# Patient Record
Sex: FEMALE | Race: WHITE | NOT HISPANIC OR LATINO | URBAN - METROPOLITAN AREA
[De-identification: names, ages, dates, MRNs, and addresses within clinical notes are randomized per-mention and may not be internally consistent; named-entity substitution may affect disease eponyms.]

---

## 2017-01-03 ENCOUNTER — AMBULATORY - HEALTHEAST (OUTPATIENT)
Dept: CARDIAC REHAB | Facility: HOSPITAL | Age: 72
End: 2017-01-03

## 2017-01-03 DIAGNOSIS — Z95.5 STENTED CORONARY ARTERY: ICD-10-CM

## 2017-01-09 ENCOUNTER — AMBULATORY - HEALTHEAST (OUTPATIENT)
Dept: CARDIAC REHAB | Facility: HOSPITAL | Age: 72
End: 2017-01-09

## 2017-01-09 DIAGNOSIS — Z95.5 STENTED CORONARY ARTERY: ICD-10-CM

## 2017-01-10 ENCOUNTER — AMBULATORY - HEALTHEAST (OUTPATIENT)
Dept: CARDIOLOGY | Facility: CLINIC | Age: 72
End: 2017-01-10

## 2017-01-10 ENCOUNTER — OFFICE VISIT - HEALTHEAST (OUTPATIENT)
Dept: CARDIOLOGY | Facility: CLINIC | Age: 72
End: 2017-01-10

## 2017-01-10 DIAGNOSIS — E78.5 HYPERLIPIDEMIA LDL GOAL <70: ICD-10-CM

## 2017-01-10 DIAGNOSIS — I10 ESSENTIAL HYPERTENSION: ICD-10-CM

## 2017-01-10 DIAGNOSIS — I25.10 CORONARY ARTERY DISEASE INVOLVING NATIVE CORONARY ARTERY OF NATIVE HEART WITHOUT ANGINA PECTORIS: ICD-10-CM

## 2017-01-10 ASSESSMENT — MIFFLIN-ST. JEOR: SCORE: 1287.2

## 2017-01-11 ENCOUNTER — AMBULATORY - HEALTHEAST (OUTPATIENT)
Dept: CARDIAC REHAB | Facility: HOSPITAL | Age: 72
End: 2017-01-11

## 2017-01-11 DIAGNOSIS — Z95.5 STENTED CORONARY ARTERY: ICD-10-CM

## 2017-06-07 ENCOUNTER — HOSPITAL ENCOUNTER (OUTPATIENT)
Dept: LAB | Age: 72
Setting detail: SPECIMEN
Discharge: HOME | End: 2017-06-07

## 2017-06-07 ENCOUNTER — RECORDS - HEALTHEAST (OUTPATIENT)
Dept: LAB | Facility: CLINIC | Age: 72
End: 2017-06-07

## 2017-06-07 LAB
CHOLEST SERPL-MCNC: 165 MG/DL
FASTING STATUS PATIENT QL REPORTED: NORMAL
HDLC SERPL-MCNC: 53 MG/DL
LDLC SERPL CALC-MCNC: 85 MG/DL
TRIGL SERPL-MCNC: 133 MG/DL

## 2017-06-21 ENCOUNTER — COMMUNICATION - HEALTHEAST (OUTPATIENT)
Dept: ADMINISTRATIVE | Facility: CLINIC | Age: 72
End: 2017-06-21

## 2017-07-11 ENCOUNTER — HOSPITAL ENCOUNTER (OUTPATIENT)
Dept: MAMMOGRAPHY | Facility: HOSPITAL | Age: 72
Discharge: HOME | End: 2017-07-11
Attending: FAMILY MEDICINE

## 2017-07-11 ENCOUNTER — AMBULATORY - HEALTHEAST (OUTPATIENT)
Dept: CARDIOLOGY | Facility: CLINIC | Age: 72
End: 2017-07-11

## 2017-07-11 DIAGNOSIS — Z12.31 VISIT FOR SCREENING MAMMOGRAM: ICD-10-CM

## 2017-07-12 ENCOUNTER — OFFICE VISIT - HEALTHEAST (OUTPATIENT)
Dept: CARDIOLOGY | Facility: CLINIC | Age: 72
End: 2017-07-12

## 2017-07-12 ENCOUNTER — AMBULATORY - HEALTHEAST (OUTPATIENT)
Dept: CARDIOLOGY | Facility: CLINIC | Age: 72
End: 2017-07-12

## 2017-07-12 DIAGNOSIS — I25.10 CORONARY ARTERY DISEASE INVOLVING NATIVE CORONARY ARTERY OF NATIVE HEART WITHOUT ANGINA PECTORIS: ICD-10-CM

## 2017-07-12 DIAGNOSIS — E78.5 HYPERLIPIDEMIA LDL GOAL <70: ICD-10-CM

## 2017-07-12 DIAGNOSIS — I10 ESSENTIAL HYPERTENSION: ICD-10-CM

## 2017-07-12 RX ORDER — AMOXICILLIN 500 MG/1
2000 CAPSULE ORAL
Status: SHIPPED | COMMUNITY
Start: 2017-04-27

## 2017-07-12 ASSESSMENT — MIFFLIN-ST. JEOR: SCORE: 1305.34

## 2017-11-16 ENCOUNTER — RECORDS - HEALTHEAST (OUTPATIENT)
Dept: ADMINISTRATIVE | Facility: OTHER | Age: 72
End: 2017-11-16

## 2017-11-16 ENCOUNTER — HOSPITAL ENCOUNTER (OUTPATIENT)
Dept: MAMMOGRAPHY | Facility: HOSPITAL | Age: 72
Discharge: HOME | End: 2017-11-16
Attending: OBSTETRICS & GYNECOLOGY

## 2017-11-16 DIAGNOSIS — N63.20 LUMP OF LEFT BREAST: ICD-10-CM

## 2017-11-16 DIAGNOSIS — N64.59 OTHER SIGNS AND SYMPTOMS IN BREAST (CODE): ICD-10-CM

## 2017-11-16 DIAGNOSIS — N64.9 DISORDER OF BREAST: ICD-10-CM

## 2018-04-19 ENCOUNTER — COMMUNICATION - HEALTHEAST (OUTPATIENT)
Dept: TELEHEALTH | Facility: CLINIC | Age: 73
End: 2018-04-19

## 2018-05-08 ENCOUNTER — COMMUNICATION - HEALTHEAST (OUTPATIENT)
Dept: ADMINISTRATIVE | Facility: CLINIC | Age: 73
End: 2018-05-08

## 2018-06-11 ENCOUNTER — COMMUNICATION - HEALTHEAST (OUTPATIENT)
Dept: TELEHEALTH | Facility: CLINIC | Age: 73
End: 2018-06-11

## 2018-06-11 ENCOUNTER — OFFICE VISIT - HEALTHEAST (OUTPATIENT)
Dept: CARDIOLOGY | Facility: CLINIC | Age: 73
End: 2018-06-11

## 2018-06-11 DIAGNOSIS — E78.5 HYPERLIPIDEMIA LDL GOAL <70: ICD-10-CM

## 2018-06-11 DIAGNOSIS — I25.10 CORONARY ARTERY DISEASE INVOLVING NATIVE CORONARY ARTERY OF NATIVE HEART WITHOUT ANGINA PECTORIS: ICD-10-CM

## 2018-06-11 DIAGNOSIS — Z01.810 PRE-OPERATIVE CARDIOVASCULAR EXAMINATION: ICD-10-CM

## 2018-06-11 DIAGNOSIS — I10 ESSENTIAL HYPERTENSION: ICD-10-CM

## 2018-06-11 ASSESSMENT — MIFFLIN-ST. JEOR: SCORE: 1263.39

## 2018-06-20 ENCOUNTER — RECORDS - HEALTHEAST (OUTPATIENT)
Dept: LAB | Facility: CLINIC | Age: 73
End: 2018-06-20

## 2018-06-20 LAB
ALBUMIN SERPL-MCNC: 3.7 G/DL (ref 3.5–5)
ALP SERPL-CCNC: 79 U/L (ref 45–120)
ALT SERPL W P-5'-P-CCNC: 20 U/L (ref 0–45)
ANION GAP SERPL CALCULATED.3IONS-SCNC: 10 MMOL/L (ref 5–18)
AST SERPL W P-5'-P-CCNC: 30 U/L (ref 0–40)
BILIRUB SERPL-MCNC: 0.7 MG/DL (ref 0–1)
BUN SERPL-MCNC: 16 MG/DL (ref 8–28)
CALCIUM SERPL-MCNC: 9.5 MG/DL (ref 8.5–10.5)
CHLORIDE BLD-SCNC: 104 MMOL/L (ref 98–107)
CHOLEST SERPL-MCNC: 165 MG/DL
CO2 SERPL-SCNC: 26 MMOL/L (ref 22–31)
CREAT SERPL-MCNC: 0.78 MG/DL (ref 0.6–1.1)
FASTING STATUS PATIENT QL REPORTED: YES
GFR SERPL CREATININE-BSD FRML MDRD: >60 ML/MIN/1.73M2
GLUCOSE BLD-MCNC: 89 MG/DL (ref 70–125)
HDLC SERPL-MCNC: 49 MG/DL
LDLC SERPL CALC-MCNC: 91 MG/DL
POTASSIUM BLD-SCNC: 4.3 MMOL/L (ref 3.5–5)
PROT SERPL-MCNC: 7.3 G/DL (ref 6–8)
SODIUM SERPL-SCNC: 140 MMOL/L (ref 136–145)
TRIGL SERPL-MCNC: 127 MG/DL
TSH SERPL DL<=0.005 MIU/L-ACNC: 1.16 UIU/ML (ref 0.3–5)

## 2018-07-06 ENCOUNTER — HOSPITAL ENCOUNTER (OUTPATIENT)
Dept: MAMMOGRAPHY | Facility: CLINIC | Age: 73
Discharge: HOME | End: 2018-07-06
Attending: FAMILY MEDICINE

## 2018-07-06 DIAGNOSIS — Z12.31 VISIT FOR SCREENING MAMMOGRAM: ICD-10-CM

## 2018-07-11 ENCOUNTER — RECORDS - HEALTHEAST (OUTPATIENT)
Dept: ADMINISTRATIVE | Facility: OTHER | Age: 73
End: 2018-07-11

## 2018-07-13 ENCOUNTER — HOSPITAL ENCOUNTER (OUTPATIENT)
Dept: MAMMOGRAPHY | Facility: CLINIC | Age: 73
Discharge: HOME | End: 2018-07-13
Attending: FAMILY MEDICINE

## 2018-07-13 DIAGNOSIS — N64.89 BREAST ASYMMETRY: ICD-10-CM

## 2019-04-09 ENCOUNTER — COMMUNICATION - HEALTHEAST (OUTPATIENT)
Dept: ADMINISTRATIVE | Facility: CLINIC | Age: 74
End: 2019-04-09

## 2019-05-08 ENCOUNTER — COMMUNICATION - HEALTHEAST (OUTPATIENT)
Dept: ADMINISTRATIVE | Facility: CLINIC | Age: 74
End: 2019-05-08

## 2019-06-24 ENCOUNTER — RECORDS - HEALTHEAST (OUTPATIENT)
Dept: ADMINISTRATIVE | Facility: OTHER | Age: 74
End: 2019-06-24

## 2019-06-24 ENCOUNTER — RECORDS - HEALTHEAST (OUTPATIENT)
Dept: LAB | Facility: CLINIC | Age: 74
End: 2019-06-24

## 2019-06-24 LAB
ALBUMIN SERPL-MCNC: 4 G/DL (ref 3.5–5)
ALP SERPL-CCNC: 71 U/L (ref 45–120)
ALT SERPL W P-5'-P-CCNC: 19 U/L (ref 0–45)
ANION GAP SERPL CALCULATED.3IONS-SCNC: 10 MMOL/L (ref 5–18)
AST SERPL W P-5'-P-CCNC: 29 U/L (ref 0–40)
BASOPHILS # BLD AUTO: 0.1 THOU/UL (ref 0–0.2)
BASOPHILS NFR BLD AUTO: 1 % (ref 0–2)
BILIRUB SERPL-MCNC: 0.7 MG/DL (ref 0–1)
BUN SERPL-MCNC: 21 MG/DL (ref 8–28)
CALCIUM SERPL-MCNC: 10 MG/DL (ref 8.5–10.5)
CHLORIDE BLD-SCNC: 103 MMOL/L (ref 98–107)
CHOLEST SERPL-MCNC: 181 MG/DL
CO2 SERPL-SCNC: 26 MMOL/L (ref 22–31)
CREAT SERPL-MCNC: 0.83 MG/DL (ref 0.6–1.1)
EOSINOPHIL # BLD AUTO: 0.2 THOU/UL (ref 0–0.4)
EOSINOPHIL NFR BLD AUTO: 4 % (ref 0–6)
ERYTHROCYTE [DISTWIDTH] IN BLOOD BY AUTOMATED COUNT: 13.1 % (ref 11–14.5)
FASTING STATUS PATIENT QL REPORTED: NORMAL
GFR SERPL CREATININE-BSD FRML MDRD: >60 ML/MIN/1.73M2
GLUCOSE BLD-MCNC: 94 MG/DL (ref 70–125)
HCT VFR BLD AUTO: 39 % (ref 35–47)
HDLC SERPL-MCNC: 55 MG/DL
HGB BLD-MCNC: 12.6 G/DL (ref 12–16)
LDLC SERPL CALC-MCNC: 102 MG/DL
LYMPHOCYTES # BLD AUTO: 1.5 THOU/UL (ref 0.8–4.4)
LYMPHOCYTES NFR BLD AUTO: 23 % (ref 20–40)
MCH RBC QN AUTO: 29.6 PG (ref 27–34)
MCHC RBC AUTO-ENTMCNC: 32.3 G/DL (ref 32–36)
MCV RBC AUTO: 92 FL (ref 80–100)
MONOCYTES # BLD AUTO: 0.5 THOU/UL (ref 0–0.9)
MONOCYTES NFR BLD AUTO: 8 % (ref 2–10)
NEUTROPHILS # BLD AUTO: 4.2 THOU/UL (ref 2–7.7)
NEUTROPHILS NFR BLD AUTO: 65 % (ref 50–70)
PLATELET # BLD AUTO: 266 THOU/UL (ref 140–440)
PMV BLD AUTO: 11.2 FL (ref 8.5–12.5)
POTASSIUM BLD-SCNC: 4 MMOL/L (ref 3.5–5)
PROT SERPL-MCNC: 7.3 G/DL (ref 6–8)
RBC # BLD AUTO: 4.25 MILL/UL (ref 3.8–5.4)
SODIUM SERPL-SCNC: 139 MMOL/L (ref 136–145)
TRIGL SERPL-MCNC: 119 MG/DL
WBC: 6.4 THOU/UL (ref 4–11)

## 2019-07-16 ASSESSMENT — MIFFLIN-ST. JEOR: SCORE: 1245.24

## 2019-07-17 ENCOUNTER — ANESTHESIA - HEALTHEAST (OUTPATIENT)
Dept: SURGERY | Facility: HOSPITAL | Age: 74
End: 2019-07-17

## 2019-07-18 ENCOUNTER — SURGERY - HEALTHEAST (OUTPATIENT)
Dept: SURGERY | Facility: HOSPITAL | Age: 74
End: 2019-07-18

## 2019-07-18 ASSESSMENT — MIFFLIN-ST. JEOR: SCORE: 1272.46

## 2019-07-23 ENCOUNTER — AMBULATORY - HEALTHEAST (OUTPATIENT)
Dept: CARDIOLOGY | Facility: CLINIC | Age: 74
End: 2019-07-23

## 2019-07-29 ENCOUNTER — OFFICE VISIT - HEALTHEAST (OUTPATIENT)
Dept: CARDIOLOGY | Facility: CLINIC | Age: 74
End: 2019-07-29

## 2019-07-29 DIAGNOSIS — I25.10 CORONARY ARTERY DISEASE INVOLVING NATIVE CORONARY ARTERY OF NATIVE HEART WITHOUT ANGINA PECTORIS: ICD-10-CM

## 2019-07-29 DIAGNOSIS — E78.5 DYSLIPIDEMIA, GOAL LDL BELOW 70: ICD-10-CM

## 2019-07-29 DIAGNOSIS — I10 ESSENTIAL HYPERTENSION: ICD-10-CM

## 2019-07-29 RX ORDER — ROSUVASTATIN CALCIUM 40 MG/1
40 TABLET, COATED ORAL AT BEDTIME
Qty: 30 TABLET | Refills: 11 | Status: SHIPPED | OUTPATIENT
Start: 2019-07-29

## 2019-07-29 ASSESSMENT — MIFFLIN-ST. JEOR: SCORE: 1277

## 2019-08-30 ENCOUNTER — HOSPITAL ENCOUNTER (OUTPATIENT)
Dept: MAMMOGRAPHY | Facility: CLINIC | Age: 74
Discharge: HOME | End: 2019-08-30
Attending: FAMILY MEDICINE

## 2019-08-30 DIAGNOSIS — Z12.31 VISIT FOR SCREENING MAMMOGRAM: ICD-10-CM

## 2021-05-24 ENCOUNTER — RECORDS - HEALTHEAST (OUTPATIENT)
Dept: ADMINISTRATIVE | Facility: CLINIC | Age: 76
End: 2021-05-24

## 2021-05-25 ENCOUNTER — RECORDS - HEALTHEAST (OUTPATIENT)
Dept: ADMINISTRATIVE | Facility: CLINIC | Age: 76
End: 2021-05-25

## 2021-05-26 ENCOUNTER — RECORDS - HEALTHEAST (OUTPATIENT)
Dept: ADMINISTRATIVE | Facility: CLINIC | Age: 76
End: 2021-05-26

## 2021-05-27 ENCOUNTER — RECORDS - HEALTHEAST (OUTPATIENT)
Dept: ADMINISTRATIVE | Facility: CLINIC | Age: 76
End: 2021-05-27

## 2021-05-28 ENCOUNTER — RECORDS - HEALTHEAST (OUTPATIENT)
Dept: ADMINISTRATIVE | Facility: CLINIC | Age: 76
End: 2021-05-28

## 2021-05-30 ENCOUNTER — RECORDS - HEALTHEAST (OUTPATIENT)
Dept: ADMINISTRATIVE | Facility: CLINIC | Age: 76
End: 2021-05-30

## 2021-05-30 VITALS — BODY MASS INDEX: 31.58 KG/M2 | WEIGHT: 184 LBS

## 2021-05-30 VITALS — BODY MASS INDEX: 32.07 KG/M2 | WEIGHT: 181 LBS | HEIGHT: 63 IN

## 2021-05-30 VITALS — BODY MASS INDEX: 32.94 KG/M2 | WEIGHT: 183 LBS

## 2021-05-30 NOTE — ANESTHESIA PREPROCEDURE EVALUATION
Anesthesia Evaluation      Patient summary reviewed   History of anesthetic complications     Airway   Mallampati: I  Neck ROM: full   Pulmonary - normal exam                          Cardiovascular - normal exam  (+) hypertension, CAD, CABG/stent, , hypercholesterolemia,     (-) murmur  ECG reviewed  Rhythm: regular  Rate: normal,    no murmur      Neuro/Psych    (+) anxiety/panic attacks,     Endo/Other       GI/Hepatic/Renal    (+) GERD,             Dental - normal exam                        Anesthesia Plan  Planned anesthetic: general endotracheal    ASA 2   Induction: intravenous   Anesthetic plan and risks discussed with: patient and spouse  Anesthesia plan special considerations: antiemetics,   Post-op plan: routine recovery

## 2021-05-30 NOTE — ANESTHESIA CARE TRANSFER NOTE
Last vitals:   Vitals:    07/18/19 1136   BP: 148/69   Pulse: 93   Resp: 20   Temp: 37.1  C (98.8  F)   SpO2: 99%     Patient's level of consciousness is drowsy  Spontaneous respirations: yes  Maintains airway independently: yes  Dentition unchanged: yes  Oropharynx: oropharynx clear of all foreign objects    QCDR Measures:  ASA# 20 - Surgical Safety Checklist: WHO surgical safety checklist completed prior to induction    PQRS# 430 - Adult PONV Prevention: 4558F - Pt received => 2 anti-emetic agents (different classes) preop & intraop  ASA# 8 - Peds PONV Prevention: 4558F - Pt received => 2 anti-emetic agents (different classes) preop & intraop  PQRS# 424 - Mercedes-op Temp Management: 4559F - At least one body temp DOCUMENTED => 35.5C or 95.9F within required timeframe  PQRS# 426 - PACU Transfer Protocol: - Transfer of care checklist used  ASA# 14 - Acute Post-op Pain: ASA14B - Patient did NOT experience pain >= 7 out of 10

## 2021-05-30 NOTE — ANESTHESIA POSTPROCEDURE EVALUATION
Patient: Dora Collins  CYSTOCELE REPAIR, UTEROSACRAL LIGAMENT SUSPENSION, RECTOCELE REPAIR, CYSTOSCOPY  Anesthesia type: general    Patient location: N124  Last vitals:   Vitals Value Taken Time   /55 7/19/2019  7:45 AM   Temp 36.8  C (98.2  F) 7/19/2019  7:45 AM   Pulse 66 7/19/2019  7:45 AM   Resp 17 7/19/2019  7:45 AM   SpO2 97 % 7/19/2019  7:45 AM     Post vital signs: stable  Level of consciousness: alert and conversing  Post-anesthesia pain: pain controlled  Post-anesthesia nausea and vomiting: no  Pulmonary: room air  Cardiovascular: stable and blood pressure at baseline  Hydration: adequate  Anesthetic events: no    QCDR Measures:  ASA# 11 - Mercedes-op Cardiac Arrest: ASA11B - Patient did NOT experience unanticipated cardiac arrest  ASA# 12 - Mercedes-op Mortality Rate: ASA12B - Patient did NOT die  ASA# 13 - PACU Re-Intubation Rate: ASA13B - Patient did NOT require a new airway mgmt  ASA# 10 - Composite Anes Safety: ASA10A - No serious adverse event    Additional Notes:

## 2021-05-31 VITALS — BODY MASS INDEX: 32.78 KG/M2 | HEIGHT: 63 IN | WEIGHT: 185 LBS

## 2021-06-01 ENCOUNTER — RECORDS - HEALTHEAST (OUTPATIENT)
Dept: ADMINISTRATIVE | Facility: CLINIC | Age: 76
End: 2021-06-01

## 2021-06-01 VITALS — WEIGHT: 174 LBS | HEIGHT: 63 IN | BODY MASS INDEX: 30.83 KG/M2

## 2021-06-02 ENCOUNTER — RECORDS - HEALTHEAST (OUTPATIENT)
Dept: ADMINISTRATIVE | Facility: CLINIC | Age: 76
End: 2021-06-02

## 2021-06-03 VITALS — HEIGHT: 63 IN | WEIGHT: 177 LBS | BODY MASS INDEX: 31.36 KG/M2

## 2021-06-03 VITALS — HEIGHT: 63 IN | WEIGHT: 176 LBS | BODY MASS INDEX: 31.18 KG/M2

## 2021-06-08 NOTE — PROGRESS NOTES
See Doc Flowsheet  Dora Collins has participated in 6 sessions of Phase II Cardiac Rehab.    Progress Report: Dr. Fuentes  Cardiac Rehab Treatment Progress Report 12/19/2016 12/21/2016 12/28/2016 1/3/2017 1/9/2017   Pre Exercise  HR 66 67 65 72 73   Pre Exercise /66 122/68 102/60 110/60 108/58   Pre Blood Sugar (mg/dl) - - - - -   Treadmill Peak  115 115 119 116-120   Nustep Peak Heart Rate 90 85 85 99 103   Nustep Peak Blood Pressure 144/72 140/80 138/66 130/70 158/60   Heart Rate 69 68 74 77 76   Post Exercise /64 108/60 110/56 122/62 102/60   Post Blood Sugar (mg/dl) - - - - -   ECG SR/ST SR/ST SR/ST SR/ST SR/ST isolated PVC rare   Total Exercise Minutes 35 40 40 40 42         Current Status:  Currently exercising for 40 min at 2.8 met level, without sx. Plans to leave for [place] next week for the winter. Has health club there that she states she will cont. her exercise.     If Physician recommends change in treatment plan, please place orders.        __________________________________________________      _____________  Signature                                                                                                  Date

## 2021-06-08 NOTE — PROGRESS NOTES
ITP ASSESSMENT   Assessment Day: 30 Day - Discharge Note  Session Number: 7  Precautions: Standard  Diagnosis: Stent  Risk Stratification: Medium  Referring Provider: Hedy  EXERCISE  Exercise Assessment: Discharge     Tolerating 2.8 METs for 40'. Denies sx                       Exercise Plan  Education Goals Met: Has system for taking medication.;Medication review.;Patient can state cardiac s/s and appropriate emergency response.                        Goals Met  Initial ADL's goals met: Pt is walking 15-30' daily - goal met  Initial Leisure goals met: Pt is traveling to [place] next week - goal not yet met  Intial Work goals met: Pt has cleaned out boxes - goal met  Initial Progression: Pt will meet all goals next week, states no limitations with activities    Current Exercise (mins/week): 150    Interventions  Home Exercise:  Mode: Walk/Pool Walk  Frequency: Daily  Duration: 45-60'    Education Material : Individual education and counseling;Offer educational classes;Educational videos;Provide written material    Education Completed  Exercise Education Completed: Cardiac Anatomy;Signs and Symptoms;Medication review;RPE;Emergency Plan;Home Exercise;Warm up/cool down;FITT Principles;BP/HR Reponse to exercise;Benefits of Exercise;End point of exercise            Exercise Follow-up/Discharge  Follow up/Discharge: Reviewed with pt FITT principle, RPE scale, s/s of intolerance of exercise, and emergency plan. Pt states understanding NUTRITION  Nutrition Assessment: Discharge    Nutrition Risk Factors:  Nutrition Risk Factors: Dyslipidemia;Overweight  Cholesterol: 191  LDL: 103  HDL: 51  Triglycerides: 185    Nutrition Plan  Interventions  Nutrition Interventions: Therapist/Patient discussion (Patient declined nutrition consult.)    Education Completed  Nutrition Education Completed: Low Saturated fat diet;Risk factor overview;Low sodium diet    Goals Met  Nutrition Goals Met: Patient can identify their risk factors for  "CAD;Patient follows a low sodium diet;Completed Nutritional Risk Screen;Provided Rate your Plate Survey;Reviewed Dietitian schedule;Patient states following a low saturated fat diet;Patient knows appropriate portion size    Height, Weight, and  BMI  Weight: 183 lb (83 kg)  Height: 5' 3\" (1.6 m)  BMI: 32.42    Nutrition Follow-up  Follow-up/Discharge: Patient stated she follows a low saturated fat and low sodium diet with lots of fruits and vegetables.  She stated she does not need any further dietary education.      Other Risk Factors  Other Risk Factor Assessment: Discharge    HTN Risk Factor: Hypertension    Pre Exercise BP: 104/70  Post Exercise BP: 102/60    Hypertension Plan    Goals Met  HTN Goals Met: Take medication as prescribed;Follow low sodium diet;Exercises regularly    HTN Interventions  HTN Interventions: Therapist/patient discussion;Offer educational videos;Offer educational classes;Provide written material    HTN Education Completed  HTN Education Completed: Low sodium diet;Medication review;Risk factor overview    Tobacco Risk Factor: NA    Risk Factor Follow-up   Follow-up/Discharge: Pt states she is compliant with her medications, exercising regularly, and follows 2g sodium diet. Reviewed importance of managing HTN, pt states understanding PSYCHOSOCIAL  Psychosocial Assessment: Discharge     [facility]KE Q of L Summary Score: 15    SAIMR-D Score: 12    Psychosocial Risk Factor: Stress    Psychosocial Plan  Interventions  Interventions: Individual education and counseling;Offer educational videos and classes;Provide written material    Education Completed  Education Completed: Effects of stress on body    Goals Met  Goals Met: Identified Support system;Oriented to stress management classes;Identify stressors;Practicing stress management skills    Psychosocial Follow-up  Follow-up/Discharge: Pt states she continues to work on managing her stress. Reviewed effects of stress on the heart and " importance of stress mgmt. Pt states understanding         Patient involved in Goal setting?: Yes    Signature: _____________________________________________________________    Date: __________________    Time: __________________

## 2021-06-19 NOTE — LETTER
Letter by Ethan Fuentes MD at      Author: Ethan Fuentes MD Service: -- Author Type: --    Filed:  Encounter Date: 4/9/2019 Status: (Other)         Dora TAL Collins  [address] [place] [address]      April 9, 2019      Dear Dora,    This letter is to remind you that you will be due for your follow up appointment with Dr. Ethan Fuentes  . To help ensure you are in the best health possible, a regular follow-up with your cardiologist is essential.     Please call our Patient Scheduling Line at [phone] to schedule your appointment at your earliest convenience.  If you have recently scheduled an appointment, please disregard this letter.    We look forward to seeing you again. As always, we are available at the number  above for any questions or concerns you may have.      Sincerely,     The Physicians and Staff of [facility]

## 2021-06-19 NOTE — LETTER
Letter by Ethan Fuentes MD at      Author: Ethan Fuentes MD Service: -- Author Type: --    Filed:  Encounter Date: 5/8/2019 Status: (Other)         Dora TAL Collins  [address] [place] [address]      May 8, 2019      Dear Dora,    This letter is to remind you that you will be due for your follow up appointment with Dr. Ethan Fuentes . To help ensure you are in the best health possible, a regular follow-up with your cardiologist is essential.     Please call our Patient Scheduling Line at [phone] to schedule your appointment at your earliest convenience.  If you have recently scheduled an appointment, please disregard this letter.    We look forward to seeing you again. As always, we are available at the number  above for any questions or concerns you may have.      Sincerely,     The Physicians and Staff of [facility]

## 2021-06-25 NOTE — PROGRESS NOTES
Progress Notes by Ethan Fuentes MD at 7/12/2017  9:50 AM     Author: Ethan Fuentes MD Service: -- Author Type: Physician    Filed: 7/12/2017 10:42 AM Encounter Date: 7/12/2017 Status: Signed    : Ethan Fuentes MD (Physician)           Click to link to [facility] HEART [facility] NOTE       Assessment/Plan:   1.  Coronary artery disease s/p multiple stents as mentioned in 3 vessels: No chest pain.  Continue aspirin, Plavix, and Lipitor.    2.  Essential hypertension: Her blood pressure has been controlled with hydrochlorothiazide and lisinopril.    3.  Hyperlipidemia: Continue 80 mg daily.  Last LDL was 85.  HDL was 53.  Discussed a more aggressive diet control, regular exercise and weight loss.    Thank you for the opportunity to be involved in the care of Dora Collins. If you have any questions, please feel free to contact me.  I will see the patient again in 12 months.    Much or all of the text in this note was generated through the use of Dragon Dictate voice-to-text software. Errors in spelling or words which seem out of context are unintentional.   Sound alike errors, in particular, may have escaped editing.       History of Present Illness:   It is my pleasure to see Dora Collins at the [facility] clinic for evaluation of Follow-up.  Dora Collins is a 72 y.o. female with a medical history of three-vessel coronary artery disease s/p IMELDA to LAD, D, LCX and RCA, essential hypertension and hyperlipidemia.    The patient states that she has been doing quite well over last 6 months.  The patient states that she had one episodes of mild chest pain when she was stressed a few months ago. She has no chest pain recently.  She has no palpitations, dizziness, orthopnea PND or leg swelling.  Her blood pressure and heart rate are controlled well.  She has no side effects from her current medications.      Past Medical History:     Patient Active Problem List   Diagnosis   ? Osteoarthritis  of left hip   ? HTN (hypertension)   ? Abnormal nuclear stress test   ? Hyperlipidemia LDL goal <70   ? CAD (coronary artery disease)       Past Surgical History:     Past Surgical History:   Procedure Laterality Date   ? BREAST BIOPSY Bilateral 2011   ? BREAST CYST ASPIRATION Right 2013   ? CARDIAC CATHETERIZATION  12/01/2016   ? CARDIAC CATHETERIZATION  12/16/2016    Synergy stent to prox LAD   ? CARDIAC CATHETERIZATION N/A 12/16/2016    Procedure: Coronary Angiogram;  Surgeon: Salud Knott MD;  Location: [facility] Cath Lab;  Service:    ? CORONARY ANGIOPLASTY WITH STENT PLACEMENT  12-    Planned.  RCA   ? CORONARY STENT PLACEMENT  12/01/2016    x4 2nd OM, 2nd Diagonal, prox and mid LAD   ? HYSTERECTOMY  1987   ? JOINT REPLACEMENT Bilateral    ? OOPHORECTOMY Bilateral 1987   ? NH CATH PLACEMENT & NJX CORONARY ART ANGIO IMG S&I N/A 12/1/2016    Procedure: Coronary Angiogram;  Surgeon: Salud Knott MD;  Location: [facility] Cath Lab;  Service: Cardiology   ? TOTAL HIP ARTHROPLASTY Left 2014       Family History:     Family History   Problem Relation Age of Onset   ? Coronary artery disease Brother    ? CABG Brother    ? Breast cancer Neg Hx         Social History:    reports that she has never smoked. She has never used smokeless tobacco. She reports that she does not drink alcohol or use illicit drugs.    Review of Systems:   General: WNL  Eyes: WNL  Ears/Nose/Throat: WNL  Lungs: WNL  Heart: WNL  Stomach: WNL  Bladder: WNL  Muscle/Joints: WNL  Skin: WNL  Nervous System: WNL  Mental Health: WNL     Blood: WNL    Meds:     Current Outpatient Prescriptions:   ?  amoxicillin (AMOXIL) 500 MG capsule, Pre-dental, Disp: , Rfl:   ?  aspirin 81 mg chewable tablet, Chew 1 tablet (81 mg total) daily., Disp: , Rfl: 0  ?  atorvastatin (LIPITOR) 80 MG tablet, Take 1 tablet (80 mg total) by mouth bedtime., Disp: , Rfl: 0  ?  clopidogrel (PLAVIX) 75 mg tablet, Take 1 tablet (75 mg total) by mouth daily., Disp: ,  "Rfl: 0  ?  estradiol (ESTRACE) 2 MG tablet, Take 2 mg by mouth bedtime. , Disp: , Rfl:   ?  FLUoxetine (PROZAC) 20 MG capsule, Take 20 mg by mouth daily., Disp: , Rfl:   ?  FOLIC ACID/MULTIVITS-MIN/LUT (CENTRUM SILVER ORAL), Take 1 tablet by mouth bedtime. , Disp: , Rfl:   ?  GLUC ALONZO/CHONDRO ALONZO A/VIT C/MN (GLUCOSAMINE 1500 COMPLEX ORAL), Take 1,500 mg by mouth bedtime. , Disp: , Rfl:   ?  lisinopril-hydrochlorothiazide (PRINZIDE,ZESTORETIC) 20-12.5 mg per tablet, Take 1 tablet by mouth every morning. , Disp: , Rfl:   ?  nitroglycerin (NITROSTAT) 0.4 MG SL tablet, Place 1 tablet (0.4 mg total) under the tongue every 5 (five) minutes as needed for chest pain., Disp: 90 tablet, Rfl: 12  ?  omeprazole (PRILOSEC) 40 MG capsule, Take 40 mg by mouth every morning. , Disp: , Rfl:   ?  oxybutynin (DITROPAN) 5 MG tablet, Take 5 mg by mouth 3 (three) times a day., Disp: , Rfl:     Allergies:   Review of patient's allergies indicates no known allergies.      Objective:      Physical Exam  185 lb (83.9 kg)  5' 2.5\" (1.588 m)  Body mass index is 33.3 kg/(m^2).  /74 (Patient Site: Right Arm, Patient Position: Sitting, Cuff Size: Adult Regular)  Pulse 68  Resp 18  Ht 5' 2.5\" (1.588 m)  Wt 185 lb (83.9 kg)  LMP 07/10/1987  BMI 33.3 kg/m2    General Appearance:   Awake, Alert, No acute distress.   HEENT:  Pupil equal and reactive to light. No scleral icterus; the mucous membranes were moist.   Neck: No cervical bruits. No JVD. No thyromegaly.     Chest: The spine was straight. The chest was symmetric.   Lungs:   Respirations unlabored; Lungs are clear to auscultation. No crackles. No wheezing.   Cardiovascular:   Regular rhythm and rate, normal first and second heart sounds with no murmurs. No rubs or gallops.    Abdomen:  Soft. No tenderness. Non-distended. Bowels sounds are present   Extremities: Equal tibial pulses. No leg edema.   Skin: No rashes or ulcers. Warm, Dry.   Musculoskeletal: No tenderness. No deformity. "   Neurologic: Mood and affect are appropriate. No focal deficits.         EKG: Personally reviewed  Normal sinus rhythm   Normal ECG   When compared with ECG of 01-DEC-2016 11:51,   No significant change was found    Cardiac Imaging Studies  Coronary angiogram with PCI on 12-1-2016:    Prox LAD lesion 70% stenosed.    Mid LAD lesion 70% stenosed.    2nd Diag lesion 70% stenosed.    2nd Mrg lesion 90% stenosed.    3rd Mrg lesion 95% stenosed. This is a distal lesion and the artery is too small for stenting.    Prox RCA lesion 90% stenosed.    Mid RCA lesion 99% stenosed.    A STENT SYNERGY MR [id number] drug eluting stent was successfully placed in the second OM    A STENT SYNERGY MR [id number] drug-eluting stent was successfully placed in the second diagonal    A STENT SYNERGY MR [id number] stent was successfully placed in the proximal LAD.    A STENT SYNERGY MR [id number] stent was successfully placed in the mid LAD.  Coronary angiogram with PCI on 12-:    Prox RCA lesion 70% stenosed and Mid RCA lesion 90% stenosed. Both lesions treated with a STENT SYNERGY MR [id number] drug eluting stent, successfully placed.    Widely patent LAD, diagonal and OM-2 stents.    Severe distal OM-2 and OM-3 small vessel disease not amenable to stenting    Nuclear stress test on 11-:  CONCLUSION:   1. Lexiscan stress nuclear study is positive for inducible myocardial ischemia.  2. There is a medium-sized moderate intensity reversible defect in the inferolateral wall representing an area of myocardial ischemia.  3. Stress echocardiogram was positive for inducible ischemia.  4. Evidence of transient ischemic dilatation with a ratio of 1.47 which can indicate underlying left main or multivessel disease.    Lab Review   Lab Results   Component Value Date     06/07/2017    K 4.3 06/07/2017     06/07/2017    CO2 21 (L) 06/07/2017    BUN 24 (H) 06/07/2017    CREATININE 0.82  06/07/2017    CALCIUM 8.9 06/07/2017     Lab Results   Component Value Date    WBC 6.4 12/16/2016    WBC 8.2 11/18/2014    HGB 11.3 (L) 12/16/2016    HCT 34.2 (L) 12/16/2016    MCV 92 12/16/2016     12/16/2016     Lab Results   Component Value Date    CHOL 165 06/07/2017    CHOL 204 (H) 12/01/2016    CHOL 191 06/06/2016     Lab Results   Component Value Date    HDL 53 06/07/2017    HDL 56 12/01/2016    HDL 51 06/06/2016     Lab Results   Component Value Date    LDLCALC 85 06/07/2017    LDLCALC 114 12/01/2016    LDLCALC 103 06/06/2016     Lab Results   Component Value Date    TRIG 133 06/07/2017    TRIG 168 (H) 12/01/2016    TRIG 185 (H) 06/06/2016

## 2021-06-25 NOTE — PROGRESS NOTES
Progress Notes by Ethan Fuentes MD at 1/10/2017  9:10 AM     Author: Ethan Fuentes MD Service: -- Author Type: Physician    Filed: 1/10/2017  1:18 PM Encounter Date: 1/10/2017 Status: Signed    : Ethan Fuentes MD (Physician)           Click to link to [facility] HEART [facility] NOTE       Assessment/Plan:   1.  Coronary artery disease s/p multiple stents as mentioned in 3 vessels: No chest pain.  Continue aspirin, Plavix, and Lipitor.    2.  Essential hypertension: Her blood pressure has been controlled with hydrochlorothiazide and lisinopril.    3.  Hyperlipidemia: Continue 80 mg daily.    Thank you for the opportunity to be involved in the care of Dora Collins. If you have any questions, please feel free to contact me.  I will see the patient again in 6 months.    Much or all of the text in this note was generated through the use of Dragon Dictate voice-to-text software. Errors in spelling or words which seem out of context are unintentional.   Sound alike errors, in particular, may have escaped editing.       History of Present Illness:   It is my pleasure to see Dora Collins at the [facility] clinic for evaluation of Follow-up.  Dora Collins is a 71 y.o. female with a medical history of three-vessel coronary artery disease s/p IMELDA to LAD, D, LCX and RCA, essential hypertension and hyperlipidemia.    The patient states that she felt much better after she had stent placement on in Nov. and Dec, 2016.  The patient states that her dyspnea on exertion has been resolved.  She has no chest pain.  She has no palpitations, dizziness, orthopnea PND or leg swelling.  Her blood pressure and heart rate are controlled well.  She has no side effects from her current medications.  She is still doing her cardiac rehab without symptoms.    Past Medical History:     Patient Active Problem List   Diagnosis   ? Osteoarthritis of left hip   ? HTN (hypertension)   ? Abnormal nuclear stress test   ?  Hyperlipidemia LDL goal <70   ? CAD (coronary artery disease)       Past Surgical History:     Past Surgical History   Procedure Laterality Date   ? Joint replacement Bilateral    ? Breast biopsy Bilateral 2011   ? Hysterectomy  1987   ? Oophorectomy Bilateral 1987   ? Breast cyst aspiration Right 2013   ? Coronary stent placement  12/01/2016     x4 2nd OM, 2nd Diagonal, prox and mid LAD   ? Pr cath placement & njx coronary art angio img s&i N/A 12/1/2016     Procedure: Coronary Angiogram;  Surgeon: Salud Knott MD;  Location: [facility] Cath Lab;  Service: Cardiology   ? Total hip arthroplasty Left 2014   ? Coronary angioplasty with stent placement  12-     Planned.  RCA   ? Cardiac catheterization  12/01/2016   ? Cardiac catheterization  12/16/2016     Synergy stent to prox LAD   ? Cardiac catheterization N/A 12/16/2016     Procedure: Coronary Angiogram;  Surgeon: Saldu Knott MD;  Location: [facility] Cath Lab;  Service:        Family History:     Family History   Problem Relation Age of Onset   ? Coronary artery disease Brother    ? CABG Brother        Social History:    reports that she has never smoked. She has never used smokeless tobacco. She reports that she does not drink alcohol or use illicit drugs.    Review of Systems:   General: WNL  Eyes: WNL  Ears/Nose/Throat: WNL  Lungs: WNL  Heart: WNL  Stomach: WNL  Bladder: WNL  Muscle/Joints: WNL  Skin: WNL  Nervous System: WNL  Mental Health: WNL     Blood: WNL    Meds:     Current Outpatient Prescriptions:   ?  aspirin 81 mg chewable tablet, Chew 1 tablet (81 mg total) daily., Disp: , Rfl: 0  ?  atorvastatin (LIPITOR) 80 MG tablet, Take 1 tablet (80 mg total) by mouth bedtime., Disp: , Rfl: 0  ?  clopidogrel (PLAVIX) 75 mg tablet, Take 1 tablet (75 mg total) by mouth daily., Disp: , Rfl: 0  ?  estradiol (ESTRACE) 2 MG tablet, Take 2 mg by mouth bedtime. , Disp: , Rfl:   ?  FLUoxetine (PROZAC) 20 MG capsule, Take 20 mg by mouth daily., Disp: ,  "Rfl:   ?  FOLIC ACID/MULTIVITS-MIN/LUT (CENTRUM SILVER ORAL), Take 1 tablet by mouth bedtime. , Disp: , Rfl:   ?  GLUC ALONZO/CHONDRO ALONZO A/VIT C/MN (GLUCOSAMINE 1500 COMPLEX ORAL), Take 1,500 mg by mouth bedtime. , Disp: , Rfl:   ?  lisinopril-hydrochlorothiazide (PRINZIDE,ZESTORETIC) 20-12.5 mg per tablet, Take 1 tablet by mouth every morning. , Disp: , Rfl:   ?  nitroglycerin (NITROSTAT) 0.4 MG SL tablet, Place 1 tablet (0.4 mg total) under the tongue every 5 (five) minutes as needed for chest pain., Disp: 90 tablet, Rfl: 12  ?  omeprazole (PRILOSEC) 40 MG capsule, Take 40 mg by mouth every morning. , Disp: , Rfl:   ?  oxybutynin (DITROPAN) 5 MG tablet, Take 5 mg by mouth 3 (three) times a day., Disp: , Rfl:     Allergies:   Review of patient's allergies indicates no known allergies.      Objective:      Physical Exam  181 lb (82.1 kg)  5' 2.5\" (1.588 m)  Body mass index is 32.58 kg/(m^2).  Visit Vitals   ? /54 (Patient Site: Right Arm, Patient Position: Sitting, Cuff Size: Adult Regular)   ? Pulse 72   ? Resp 16   ? Ht 5' 2.5\" (1.588 m)   ? Wt 181 lb (82.1 kg)   ? LMP 07/10/1987   ? BMI 32.58 kg/m2       General Appearance:   Awake, Alert, No acute distress.   HEENT:  Pupil equal and reactive to light. No scleral icterus; the mucous membranes were moist.   Neck: No cervical bruits. No JVD. No thyromegaly.     Chest: The spine was straight. The chest was symmetric.   Lungs:   Respirations unlabored; Lungs are clear to auscultation. No crackles. No wheezing.   Cardiovascular:   Regular rhythm and rate, normal first and second heart sounds with no murmurs. No rubs or gallops.    Abdomen:  Soft. No tenderness. Non-distended. Bowels sounds are present   Extremities: Equal tibial pulses. No leg edema.   Skin: No rashes or ulcers. Warm, Dry.   Musculoskeletal: No tenderness. No deformity.   Neurologic: Mood and affect are appropriate. No focal deficits.         EKG: Personally reviewed  Normal sinus rhythm   Normal " ECG   When compared with ECG of 01-DEC-2016 11:51,   No significant change was found    Cardiac Imaging Studies  Coronary angiogram with PCI on 12-1-2016:    Prox LAD lesion 70% stenosed.    Mid LAD lesion 70% stenosed.    2nd Diag lesion 70% stenosed.    2nd Mrg lesion 90% stenosed.    3rd Mrg lesion 95% stenosed. This is a distal lesion and the artery is too small for stenting.    Prox RCA lesion 90% stenosed.    Mid RCA lesion 99% stenosed.    A STENT SYNERGY MR [id number] drug eluting stent was successfully placed in the second OM    A STENT SYNERGY MR [id number] drug-eluting stent was successfully placed in the second diagonal    A STENT SYNERGY MR [id number] stent was successfully placed in the proximal LAD.    A STENT SYNERGY MR [id number] stent was successfully placed in the mid LAD.  Coronary angiogram with PCI on 12-:    Prox RCA lesion 70% stenosed and Mid RCA lesion 90% stenosed. Both lesions treated with a STENT SYNERGY MR [id number] drug eluting stent, successfully placed.    Widely patent LAD, diagonal and OM-2 stents.    Severe distal OM-2 and OM-3 small vessel disease not amenable to stenting    Nuclear stress test on 11-:  CONCLUSION:   1. Lexiscan stress nuclear study is positive for inducible myocardial ischemia.  2. There is a medium-sized moderate intensity reversible defect in the inferolateral wall representing an area of myocardial ischemia.  3. Stress echocardiogram was positive for inducible ischemia.  4. Evidence of transient ischemic dilatation with a ratio of 1.47 which can indicate underlying left main or multivessel disease.    Lab Review   Lab Results   Component Value Date     12/16/2016    K 4.3 12/16/2016     12/16/2016    CO2 25 12/16/2016    BUN 20 12/16/2016    CREATININE 0.71 12/16/2016    CALCIUM 8.8 12/16/2016     Lab Results   Component Value Date    WBC 6.4 12/16/2016    WBC 8.2 11/18/2014    HGB 11.3 (L)  12/16/2016    HCT 34.2 (L) 12/16/2016    MCV 92 12/16/2016     12/16/2016     Lab Results   Component Value Date    CHOL 204 (H) 12/01/2016    CHOL 191 06/06/2016    CHOL 229 (H) 06/18/2015     Lab Results   Component Value Date    HDL 56 12/01/2016    HDL 51 06/06/2016    HDL 61 06/18/2015     Lab Results   Component Value Date    LDLCALC 114 12/01/2016    LDLCALC 103 06/06/2016    LDLCALC 132 (H) 06/18/2015     Lab Results   Component Value Date    TRIG 168 (H) 12/01/2016    TRIG 185 (H) 06/06/2016    TRIG 179 (H) 06/18/2015     Lab Results   Component Value Date    TSH 0.93 06/18/2015

## 2021-06-26 NOTE — PROGRESS NOTES
Progress Notes by Ethan Fuentes MD at 6/11/2018  2:30 PM     Author: Ethan Fuentes MD Service: -- Author Type: Physician    Filed: 6/11/2018  2:34 PM Encounter Date: 6/11/2018 Status: Signed    : Ethan Fuentes MD (Physician)           Click to link to [facility] Heart [facility] HEART [facility] NOTE       Assessment/Plan:   1.  Pre-op cardiovascular clearance: The patient has no chest pain shortness of breath.  As described below, her physical capacity is higher than 4 mets.  She is going to have a low risk surgery.  The patient has no indication for further cardiac evaluation prior to surgery.  The patient is cleared for bladder suspension surgery with a low risk of from a cardiology standpoint.  The patient can hold aspirin and Plavix for 2 weeks.  She can restart these 2 medications after surgery.    2. Coronary artery disease s/p multiple stents as mentioned in 3 vessels: No chest pain.  Continue aspirin, Plavix, and Lipitor.  Because the patient has multiple stents in 3 coronary arteries, severe stenosis in small OMs, no side effects of Plavix, she will continue Plavix as discussed.    3.  Essential hypertension: Her blood pressure has been controlled with hydrochlorothiazide and lisinopril.    4.  Hyperlipidemia: Continue 80 mg daily.  Last LDL was 85.  HDL was 53.  The patient will do annual physical exam with lipid profile at a Dr. Schroeder's office.    Thank you for the opportunity to be involved in the care of Dora Collins. If you have any questions, please feel free to contact me.  I will see the patient again in 12 months.    Much or all of the text in this note was generated through the use of Dragon Dictate voice-to-text software. Errors in spelling or words which seem out of context are unintentional.   Sound alike errors, in particular, may have escaped editing.       History of Present Illness:   It is my pleasure to see Dora Collins at the [facility] clinic for evaluation of  Follow-up.  Dora Collins is a 72 y.o. female with a medical history of three-vessel coronary artery disease s/p IMELDA to LAD, D, LCX and RCA, essential hypertension and hyperlipidemia.    The patient is going to have bladder suspension procedure.  She is here for pre-cardiovascular clearance too.  She had no chest pain, shortness of breath, palpitations, dizziness, orthopnea PND or leg swelling.  She does exercise regularly.  She walks 12 blocks redirected chest pain or shortness of breath.  She can climb stairs from first floor to third floor without problem.  Her blood pressure and heart rate are controlled well.  She has no side effects from her current medications.      Past Medical History:     Patient Active Problem List   Diagnosis   ? Osteoarthritis of left hip   ? HTN (hypertension)   ? Abnormal nuclear stress test   ? Hyperlipidemia LDL goal <70   ? CAD (coronary artery disease)       Past Surgical History:     Past Surgical History:   Procedure Laterality Date   ? BREAST BIOPSY Bilateral 2011   ? BREAST CYST ASPIRATION Right 2013   ? CARDIAC CATHETERIZATION  12/01/2016   ? CARDIAC CATHETERIZATION  12/16/2016    Synergy stent to prox LAD   ? CARDIAC CATHETERIZATION N/A 12/16/2016    Procedure: Coronary Angiogram;  Surgeon: Salud Knott MD;  Location: [facility] Cath Lab;  Service:    ? CORONARY ANGIOPLASTY WITH STENT PLACEMENT  12-    Planned.  RCA   ? CORONARY STENT PLACEMENT  12/01/2016    x4 2nd OM, 2nd Diagonal, prox and mid LAD   ? HYSTERECTOMY  1987   ? JOINT REPLACEMENT Bilateral    ? OOPHORECTOMY Bilateral 1987   ? UT CATH PLACEMENT & NJX CORONARY ART ANGIO IMG S&I N/A 12/1/2016    Procedure: Coronary Angiogram;  Surgeon: Salud Knott MD;  Location: [facility] Cath Lab;  Service: Cardiology   ? TOTAL HIP ARTHROPLASTY Left 2014       Family History:     Family History   Problem Relation Age of Onset   ? Coronary artery disease Brother    ? CABG Brother    ? Breast cancer Neg Hx      "    Social History:    reports that she has never smoked. She has never used smokeless tobacco. She reports that she does not drink alcohol or use illicit drugs.    Review of Systems:   General: WNL  Eyes: WNL  Ears/Nose/Throat: WNL  Lungs: WNL  Heart: WNL  Stomach: WNL  Bladder: WNL  Muscle/Joints: WNL  Skin: WNL  Nervous System: WNL  Mental Health: WNL     Blood: WNL    Meds:     Current Outpatient Prescriptions:   ?  amoxicillin (AMOXIL) 500 MG capsule, Pre-dental, Disp: , Rfl:   ?  aspirin 81 mg chewable tablet, Chew 1 tablet (81 mg total) daily., Disp: , Rfl: 0  ?  atorvastatin (LIPITOR) 80 MG tablet, Take 1 tablet (80 mg total) by mouth bedtime., Disp: , Rfl: 0  ?  clopidogrel (PLAVIX) 75 mg tablet, Take 1 tablet (75 mg total) by mouth daily., Disp: , Rfl: 0  ?  estradiol (ESTRACE) 2 MG tablet, Take 2 mg by mouth bedtime. , Disp: , Rfl:   ?  FLUoxetine (PROZAC) 20 MG capsule, Take 20 mg by mouth daily., Disp: , Rfl:   ?  FOLIC ACID/MULTIVITS-MIN/LUT (CENTRUM SILVER ORAL), Take 1 tablet by mouth bedtime. , Disp: , Rfl:   ?  GLUC ALONZO/CHONDRO ALONZO A/VIT C/MN (GLUCOSAMINE 1500 COMPLEX ORAL), Take 1,500 mg by mouth bedtime. , Disp: , Rfl:   ?  lisinopril-hydrochlorothiazide (PRINZIDE,ZESTORETIC) 20-12.5 mg per tablet, Take 1 tablet by mouth every morning. , Disp: , Rfl:   ?  nitroglycerin (NITROSTAT) 0.4 MG SL tablet, Place 1 tablet (0.4 mg total) under the tongue every 5 (five) minutes as needed for chest pain., Disp: 90 tablet, Rfl: 12  ?  omeprazole (PRILOSEC) 40 MG capsule, Take 40 mg by mouth every morning. , Disp: , Rfl:   ?  oxybutynin (DITROPAN) 5 MG tablet, Take 5 mg by mouth 3 (three) times a day., Disp: , Rfl:     Allergies:   Review of patient's allergies indicates no known allergies.      Objective:      Physical Exam  174 lb (78.9 kg)  5' 3\" (1.6 m)  Body mass index is 30.82 kg/(m^2).  /60 (Patient Site: Right Arm, Patient Position: Sitting, Cuff Size: Adult Regular)  Pulse 76  Resp 16  Ht 5' 3\" " (1.6 m)  Wt 174 lb (78.9 kg)  LMP 07/10/1987  BMI 30.82 kg/m2    General Appearance:   Awake, Alert, No acute distress.   HEENT:  Pupil equal and reactive to light. No scleral icterus; the mucous membranes were moist.   Neck: No cervical bruits. No JVD. No thyromegaly.     Chest: The spine was straight. The chest was symmetric.   Lungs:   Respirations unlabored; Lungs are clear to auscultation. No crackles. No wheezing.   Cardiovascular:   Regular rhythm and rate, normal first and second heart sounds with no murmurs. No rubs or gallops.    Abdomen:  Soft. No tenderness. Non-distended. Bowels sounds are present   Extremities: Equal tibial pulses. No leg edema.   Skin: No rashes or ulcers. Warm, Dry.   Musculoskeletal: No tenderness. No deformity.   Neurologic: Mood and affect are appropriate. No focal deficits.         EKG: Personally reviewed  Normal sinus rhythm   Normal ECG   When compared with ECG of 01-DEC-2016 11:51,   No significant change was found    Cardiac Imaging Studies  Coronary angiogram with PCI on 12-1-2016:    Prox LAD lesion 70% stenosed.    Mid LAD lesion 70% stenosed.    2nd Diag lesion 70% stenosed.    2nd Mrg lesion 90% stenosed.    3rd Mrg lesion 95% stenosed. This is a distal lesion and the artery is too small for stenting.    Prox RCA lesion 90% stenosed.    Mid RCA lesion 99% stenosed.    A STENT SYNERGY MR [id number] drug eluting stent was successfully placed in the second OM    A STENT SYNERGY MR [id number] drug-eluting stent was successfully placed in the second diagonal    A STENT SYNERGY MR [id number] stent was successfully placed in the proximal LAD.    A STENT SYNERGY MR [id number] stent was successfully placed in the mid LAD.  Coronary angiogram with PCI on 12-:    Prox RCA lesion 70% stenosed and Mid RCA lesion 90% stenosed. Both lesions treated with a STENT SYNERGY MR [id number] drug eluting stent, successfully placed.    Widely  patent LAD, diagonal and OM-2 stents.    Severe distal OM-2 and OM-3 small vessel disease not amenable to stenting    Nuclear stress test on 11-:  CONCLUSION:   1. Lexiscan stress nuclear study is positive for inducible myocardial ischemia.  2. There is a medium-sized moderate intensity reversible defect in the inferolateral wall representing an area of myocardial ischemia.  3. Stress echocardiogram was positive for inducible ischemia.  4. Evidence of transient ischemic dilatation with a ratio of 1.47 which can indicate underlying left main or multivessel disease.    Lab Review   Lab Results   Component Value Date     06/07/2017    K 4.3 06/07/2017     06/07/2017    CO2 21 (L) 06/07/2017    BUN 24 (H) 06/07/2017    CREATININE 0.82 06/07/2017    CALCIUM 8.9 06/07/2017     Lab Results   Component Value Date    WBC 6.4 12/16/2016    WBC 8.2 11/18/2014    HGB 11.3 (L) 12/16/2016    HCT 34.2 (L) 12/16/2016    MCV 92 12/16/2016     12/16/2016     Lab Results   Component Value Date    CHOL 165 06/07/2017    CHOL 204 (H) 12/01/2016    CHOL 191 06/06/2016     Lab Results   Component Value Date    HDL 53 06/07/2017    HDL 56 12/01/2016    HDL 51 06/06/2016     Lab Results   Component Value Date    LDLCALC 85 06/07/2017    LDLCALC 114 12/01/2016    LDLCALC 103 06/06/2016     Lab Results   Component Value Date    TRIG 133 06/07/2017    TRIG 168 (H) 12/01/2016    TRIG 185 (H) 06/06/2016

## 2021-06-27 NOTE — PROGRESS NOTES
Progress Notes by Ethan Fuentes MD at 7/29/2019  9:50 AM     Author: Ethan Fuentes MD Service: -- Author Type: Physician    Filed: 7/29/2019 10:15 AM Encounter Date: 7/29/2019 Status: Signed    : Ethan Fuentes MD (Physician)           Click to link to [facility] HEART [facility] NOTE       Assessment/Plan:   1.  Coronary artery disease s/p multiple stents as mentioned in 3 vessels: No chest pain.  Continue aspirin, Plavix, and Lipitor.  Because the patient has multiple stents in 3 coronary arteries, severe stenosis in small OMs, no side effects of Plavix, she will continue Plavix as discussed.    2.  Essential hypertension: Her blood pressure has been controlled with hydrochlorothiazide and lisinopril.    3.  Hyperlipidemia: Her recent LDL was 102, she has been on Lipitor 80 mg at bedtime.  She complains of bad taste of Lipitor for some reason.  Discontinued Lipitor, start Crestor 40 mg at bedtime.  Follow-up with Dr. Schroeder for reevaluation of Lipid profile and LFT.    Thank you for the opportunity to be involved in the care of Dora Collins. If you have any questions, please feel free to contact me.  I will see the patient again in 12 months.    Much or all of the text in this note was generated through the use of Dragon Dictate voice-to-text software. Errors in spelling or words which seem out of context are unintentional.   Sound alike errors, in particular, may have escaped editing.       History of Present Illness:   It is my pleasure to see Doar Collins at the [facility] [place] clinic for evaluation of Follow-up.  Dora Collins is a 72 y.o. female with a medical history of three-vessel coronary artery disease s/p IMELDA to LAD, D, LCX and RCA, essential hypertension and hyperlipidemia.    The patient complains of occasional irregular heartbeats, fluttering feeling.  She had evaluation in the hospital in [place], which was caused by premature ventricular contractions. She had no chest  pain, shortness of breath, palpitations, dizziness, orthopnea PND or leg swelling.  She does exercise regularly.  Her blood pressure and heart rate are controlled well.  She complains of strange taste of Lipitor, currently 80 mg at bedtime.  Her recent LDL was 102.    Past Medical History:     Patient Active Problem List   Diagnosis   ? Osteoarthritis of left hip   ? HTN (hypertension)   ? Abnormal nuclear stress test   ? Hyperlipidemia LDL goal <70   ? CAD (coronary artery disease)       Past Surgical History:     Past Surgical History:   Procedure Laterality Date   ? BREAST BIOPSY Bilateral 2011   ? BREAST CYST ASPIRATION Right 2013   ? CARDIAC CATHETERIZATION  12/01/2016   ? CARDIAC CATHETERIZATION  12/16/2016    Synergy stent to prox LAD   ? CARDIAC CATHETERIZATION N/A 12/16/2016    Procedure: Coronary Angiogram;  Surgeon: Salud Knott MD;  Location: [facility] Lab;  Service:    ? CORONARY ANGIOPLASTY WITH STENT PLACEMENT  12-    Planned.  RCA   ? CORONARY STENT PLACEMENT  12/01/2016    x4 2nd OM, 2nd Diagonal, prox and mid LAD   ? CYSTOSCOPY N/A 7/18/2019    Procedure: CYSTOSCOPY;  Surgeon: Shirley Weeks MD;  Location: [facility] [place];  Service: Gynecology   ? HYSTERECTOMY  1987   ? JOINT REPLACEMENT Bilateral    ? OOPHORECTOMY Bilateral 1987   ? AL ANTERIOR COLPORRAPHY RPR CYSTOCELE W/CYSTO N/A 7/18/2019    Procedure: CYSTOCELE REPAIR, UTEROSACRAL LIGAMENT SUSPENSION;  Surgeon: Shirley Weeks MD;  Location: [facility] [place];  Service: Gynecology   ? AL CATH PLACEMENT & NJX CORONARY ART ANGIO IMG S&I N/A 12/1/2016    Procedure: Coronary Angiogram;  Surgeon: Salud Knott MD;  Location: [facility] Lab;  Service: Cardiology   ? TOTAL HIP ARTHROPLASTY Left 2014       Family History:     Family History   Problem Relation Age of Onset   ? Coronary artery disease Brother    ? CABG Brother    ? Breast cancer Neg Hx         Social History:    reports that she has never smoked. She  "has never used smokeless tobacco. She reports that she does not drink alcohol or use drugs.    Review of Systems:   General: WNL  Eyes: WNL  Ears/Nose/Throat: WNL  Lungs: WNL  Heart: WNL  Stomach: WNL  Bladder: WNL  Muscle/Joints: WNL  Skin: WNL  Nervous System: WNL  Mental Health: WNL     Blood: Easy Bleeding, Easy Bruising    Meds:     Current Outpatient Medications:   ?  amoxicillin (AMOXIL) 500 MG capsule, Take 2,000 mg by mouth once as needed (1-2 hours prior to dental work). Pre-dental   , Disp: , Rfl:   ?  aspirin 81 mg chewable tablet, Chew 1 tablet (81 mg total) daily., Disp: , Rfl: 0  ?  atorvastatin (LIPITOR) 80 MG tablet, Take 1 tablet (80 mg total) by mouth bedtime., Disp: , Rfl: 0  ?  clopidogrel (PLAVIX) 75 mg tablet, Take 1 tablet (75 mg total) by mouth daily., Disp: , Rfl: 0  ?  estradiol (ESTRACE) 1 MG tablet, Take 1 mg by mouth at bedtime., Disp: , Rfl:   ?  FLUoxetine (PROZAC) 20 MG capsule, Take 20 mg by mouth daily., Disp: , Rfl:   ?  FOLIC ACID/MULTIVITS-MIN/LUT (CENTRUM SILVER ORAL), Take 1 tablet by mouth bedtime. , Disp: , Rfl:   ?  GLUC ALONZO/CHONDRO ALONZO A/VIT C/MN (GLUCOSAMINE 1500 COMPLEX ORAL), Take 1,500 mg by mouth bedtime. , Disp: , Rfl:   ?  lisinopril-hydrochlorothiazide (PRINZIDE,ZESTORETIC) 20-12.5 mg per tablet, Take 1 tablet by mouth every morning. , Disp: , Rfl:   ?  nitroglycerin (NITROSTAT) 0.4 MG SL tablet, Place 1 tablet (0.4 mg total) under the tongue every 5 (five) minutes as needed for chest pain., Disp: 90 tablet, Rfl: 12  ?  omeprazole (PRILOSEC) 40 MG capsule, Take 40 mg by mouth every morning. , Disp: , Rfl:   ?  rosuvastatin (CRESTOR) 40 MG tablet, Take 1 tablet (40 mg total) by mouth at bedtime., Disp: 30 tablet, Rfl: 11    Allergies:   Patient has no known allergies.      Objective:      Physical Exam  177 lb (80.3 kg)  5' 3\" (1.6 m)  Body mass index is 31.35 kg/m .  /60 (Patient Site: Left Arm, Patient Position: Sitting, Cuff Size: Adult Regular)   Pulse 72  " " Resp 14   Ht 5' 3\" (1.6 m)   Wt 177 lb (80.3 kg)   LMP 07/10/1987   BMI 31.35 kg/m      General Appearance:   Awake, Alert, No acute distress.   HEENT:  Pupil equal and reactive to light. No scleral icterus; the mucous membranes were moist.   Neck: No cervical bruits. No JVD. No thyromegaly.     Chest: The spine was straight. The chest was symmetric.   Lungs:   Respirations unlabored; Lungs are clear to auscultation. No crackles. No wheezing.   Cardiovascular:   Regular rhythm and rate, normal first and second heart sounds with no murmurs. No rubs or gallops.    Abdomen:  Soft. No tenderness. Non-distended. Bowels sounds are present   Extremities: Equal tibial pulses. No leg edema.   Skin: No rashes or ulcers. Warm, Dry.   Musculoskeletal: No tenderness. No deformity.   Neurologic: Mood and affect are appropriate. No focal deficits.         EKG: Personally reviewed  Normal sinus rhythm   Normal ECG   When compared with ECG of 01-DEC-2016 11:51,   No significant change was found    Cardiac Imaging Studies  Coronary angiogram with PCI on 12-1-2016:    Prox LAD lesion 70% stenosed.    Mid LAD lesion 70% stenosed.    2nd Diag lesion 70% stenosed.    2nd Mrg lesion 90% stenosed.    3rd Mrg lesion 95% stenosed. This is a distal lesion and the artery is too small for stenting.    Prox RCA lesion 90% stenosed.    Mid RCA lesion 99% stenosed.    A STENT SYNERGY MR [id number] drug eluting stent was successfully placed in the second OM    A STENT SYNERGY MR [id number] drug-eluting stent was successfully placed in the second diagonal    A STENT SYNERGY MR [id number] stent was successfully placed in the proximal LAD.    A STENT SYNERGY MR [id number] stent was successfully placed in the mid LAD.  Coronary angiogram with PCI on 12-:    Prox RCA lesion 70% stenosed and Mid RCA lesion 90% stenosed. Both lesions treated with a STENT SYNERGY MR [id number] drug eluting stent, " successfully placed.    Widely patent LAD, diagonal and OM-2 stents.    Severe distal OM-2 and OM-3 small vessel disease not amenable to stenting    Nuclear stress test on 11-:  CONCLUSION:   1. Lexiscan stress nuclear study is positive for inducible myocardial ischemia.  2. There is a medium-sized moderate intensity reversible defect in the inferolateral wall representing an area of myocardial ischemia.  3. Stress echocardiogram was positive for inducible ischemia.  4. Evidence of transient ischemic dilatation with a ratio of 1.47 which can indicate underlying left main or multivessel disease.    Lab Review   Lab Results   Component Value Date     06/24/2019    K 4.0 06/24/2019     06/24/2019    CO2 26 06/24/2019    BUN 21 06/24/2019    CREATININE 0.83 06/24/2019    CALCIUM 10.0 06/24/2019     Lab Results   Component Value Date    WBC 6.4 06/24/2019    WBC 8.2 11/18/2014    HGB 10.3 (L) 07/19/2019    HCT 39.0 06/24/2019    MCV 92 06/24/2019     06/24/2019     Lab Results   Component Value Date    CHOL 181 06/24/2019    CHOL 165 06/20/2018    CHOL 165 06/07/2017     Lab Results   Component Value Date    HDL 55 06/24/2019    HDL 49 (L) 06/20/2018    HDL 53 06/07/2017     Lab Results   Component Value Date    LDLCALC 102 06/24/2019    LDLCALC 91 06/20/2018    LDLCALC 85 06/07/2017     Lab Results   Component Value Date    TRIG 119 06/24/2019    TRIG 127 06/20/2018    TRIG 133 06/07/2017

## 2021-07-13 ENCOUNTER — RECORDS - HEALTHEAST (OUTPATIENT)
Dept: ADMINISTRATIVE | Facility: CLINIC | Age: 76
End: 2021-07-13

## 2021-07-21 ENCOUNTER — RECORDS - HEALTHEAST (OUTPATIENT)
Dept: ADMINISTRATIVE | Facility: CLINIC | Age: 76
End: 2021-07-21

## 2021-07-22 ENCOUNTER — RECORDS - HEALTHEAST (OUTPATIENT)
Dept: SCHEDULING | Facility: CLINIC | Age: 76
End: 2021-07-22

## 2021-07-22 DIAGNOSIS — Z12.31 OTHER SCREENING MAMMOGRAM: ICD-10-CM
